# Patient Record
Sex: MALE | Race: WHITE | NOT HISPANIC OR LATINO | Employment: FULL TIME | URBAN - METROPOLITAN AREA
[De-identification: names, ages, dates, MRNs, and addresses within clinical notes are randomized per-mention and may not be internally consistent; named-entity substitution may affect disease eponyms.]

---

## 2017-12-20 ENCOUNTER — GENERIC CONVERSION - ENCOUNTER (OUTPATIENT)
Dept: OTHER | Facility: OTHER | Age: 27
End: 2017-12-20

## 2017-12-20 DIAGNOSIS — S39.012A STRAIN OF MUSCLE, FASCIA AND TENDON OF LOWER BACK, INITIAL ENCOUNTER: ICD-10-CM

## 2017-12-22 ENCOUNTER — HOSPITAL ENCOUNTER (OUTPATIENT)
Dept: RADIOLOGY | Facility: HOSPITAL | Age: 27
Discharge: HOME | End: 2017-12-22
Attending: RADIOLOGY

## 2017-12-22 DIAGNOSIS — Z76.89 REFERRAL OF PATIENT WITHOUT EXAMINATION OR TREATMENT: ICD-10-CM

## 2018-01-18 ENCOUNTER — GENERIC CONVERSION - ENCOUNTER (OUTPATIENT)
Dept: OTHER | Facility: OTHER | Age: 28
End: 2018-01-18

## 2018-01-24 VITALS — HEART RATE: 90 BPM | DIASTOLIC BLOOD PRESSURE: 64 MMHG | SYSTOLIC BLOOD PRESSURE: 117 MMHG | RESPIRATION RATE: 18 BRPM

## 2018-01-24 VITALS — HEIGHT: 66 IN | DIASTOLIC BLOOD PRESSURE: 80 MMHG | SYSTOLIC BLOOD PRESSURE: 120 MMHG

## 2018-02-15 VITALS
BODY MASS INDEX: 26.59 KG/M2 | SYSTOLIC BLOOD PRESSURE: 118 MMHG | HEIGHT: 67 IN | HEART RATE: 66 BPM | DIASTOLIC BLOOD PRESSURE: 75 MMHG | WEIGHT: 169.4 LBS

## 2018-02-15 DIAGNOSIS — S39.012D ACUTE MYOFASCIAL STRAIN OF LUMBAR REGION, SUBSEQUENT ENCOUNTER: Primary | ICD-10-CM

## 2018-02-15 PROBLEM — S39.012A ACUTE LUMBAR MYOFASCIAL STRAIN: Status: ACTIVE | Noted: 2018-02-15

## 2018-02-15 PROCEDURE — 99213 OFFICE O/P EST LOW 20 MIN: CPT | Performed by: PHYSICIAN ASSISTANT

## 2018-02-15 NOTE — ASSESSMENT & PLAN NOTE
Patient is doing excellent  Pain has resolved  Discussed importance of continuing home exercises and stretches on a consistent basis  He may return to work with no restrictions  Follow-up as needed if any problems arise

## 2018-02-15 NOTE — PROGRESS NOTES
Assessment:     1  Acute myofascial strain of lumbar region, subsequent encounter        Plan:     Problem List Items Addressed This Visit        Musculoskeletal and Integument    Acute lumbar myofascial strain - Primary     Patient is doing excellent  Pain has resolved  Discussed importance of continuing home exercises and stretches on a consistent basis  He may return to work with no restrictions  Follow-up as needed if any problems arise  Subjective:     Patient ID: Collette Havers is a 24 y o  male  Chief Complaint: This is a 57-year-old white male who suffered a work related injury to his lumbar spine on December 18, 2017  Patient states he lifted a 100 lb braided hose at work and felt sudden pain and pop over right side of his lumbar spine as he was lifting  He has been attending physical therapy consistently since last visit  His pain has resolved  He is able to do all his normal activities  He is ready to return to work with no restrictions  Allergy:  No Known Allergies     Medications:  all current active meds have been reviewed     Past Medical History:  Past Medical History:   Diagnosis Date    Fractures      Past Surgical History:  History reviewed  No pertinent surgical history  Family History:  Family History   Problem Relation Age of Onset    No Known Problems Mother     No Known Problems Father      Social History:  History   Alcohol Use    Yes     History   Drug use: Unknown     History   Smoking Status    Never Smoker   Smokeless Tobacco    Never Used     Review of Systems   Constitutional: Negative  HENT: Negative  Eyes: Negative  Respiratory: Negative  Cardiovascular: Negative  Gastrointestinal: Negative  Endocrine: Negative  Genitourinary: Negative  Musculoskeletal: Positive for arthralgias  Skin: Negative  Neurological: Negative  Hematological: Negative  Psychiatric/Behavioral: Negative            Objective:  BP Readings from Last 1 Encounters:   02/15/18 118/75      Wt Readings from Last 1 Encounters:   02/15/18 76 8 kg (169 lb 6 4 oz)      BMI:   Estimated body mass index is 26 53 kg/m² as calculated from the following:    Height as of this encounter: 5' 7" (1 702 m)  Weight as of this encounter: 76 8 kg (169 lb 6 4 oz)  BSA:   Estimated body surface area is 1 88 meters squared as calculated from the following:    Height as of this encounter: 5' 7" (1 702 m)  Weight as of this encounter: 76 8 kg (169 lb 6 4 oz)  Physical Exam   Constitutional: He is oriented to person, place, and time  He appears well-developed  HENT:   Head: Normocephalic and atraumatic  Eyes: EOM are normal  Pupils are equal, round, and reactive to light  Neck: Neck supple  Musculoskeletal: Normal range of motion  He exhibits no tenderness  Neurological: He is alert and oriented to person, place, and time  Skin: Skin is warm  Psychiatric: He has a normal mood and affect  Nursing note and vitals reviewed  Back Exam     Tenderness   The patient is experiencing no tenderness  Range of Motion   Extension: normal   Flexion: normal   Lateral Bend Right: normal   Lateral Bend Left: normal   Rotation Right: normal   Rotation Left: normal     Muscle Strength   The patient has normal back strength      Tests   Straight leg raise right: negative  Straight leg raise left: negative    Reflexes   Patellar: normal  Achilles: normal    Other   Toe Walk: normal  Heel Walk: normal  Sensation: normal  Gait: normal